# Patient Record
Sex: FEMALE | Race: OTHER | NOT HISPANIC OR LATINO
[De-identification: names, ages, dates, MRNs, and addresses within clinical notes are randomized per-mention and may not be internally consistent; named-entity substitution may affect disease eponyms.]

---

## 2022-07-01 PROBLEM — Z00.00 ENCOUNTER FOR PREVENTIVE HEALTH EXAMINATION: Status: ACTIVE | Noted: 2022-07-01

## 2022-08-17 ENCOUNTER — APPOINTMENT (OUTPATIENT)
Dept: OTOLARYNGOLOGY | Facility: CLINIC | Age: 57
End: 2022-08-17

## 2022-08-17 VITALS
RESPIRATION RATE: 16 BRPM | HEART RATE: 64 BPM | WEIGHT: 278 LBS | BODY MASS INDEX: 48.65 KG/M2 | HEIGHT: 63.5 IN | DIASTOLIC BLOOD PRESSURE: 89 MMHG | SYSTOLIC BLOOD PRESSURE: 138 MMHG

## 2022-08-17 VITALS — BODY MASS INDEX: 48.65 KG/M2 | WEIGHT: 278 LBS | HEIGHT: 63.5 IN

## 2022-08-17 DIAGNOSIS — Z83.49 FAMILY HISTORY OF OTHER ENDOCRINE, NUTRITIONAL AND METABOLIC DISEASES: ICD-10-CM

## 2022-08-17 DIAGNOSIS — R73.03 PREDIABETES.: ICD-10-CM

## 2022-08-17 DIAGNOSIS — Z78.9 OTHER SPECIFIED HEALTH STATUS: ICD-10-CM

## 2022-08-17 PROCEDURE — 99203 OFFICE O/P NEW LOW 30 MIN: CPT

## 2022-08-17 RX ORDER — METFORMIN HYDROCHLORIDE 625 MG/1
TABLET ORAL
Refills: 0 | Status: ACTIVE | COMMUNITY

## 2022-08-17 NOTE — PHYSICAL EXAM
[Nodule] : nodule [Enlarged] : enlarged [FreeTextEntry1] : soft enlargement R lobe thyroid. no palp nodes [Midline] : trachea located in midline position [Normal] : no rashes

## 2022-08-17 NOTE — CONSULT LETTER
[Dear  ___] : Dear  [unfilled], [Consult Letter:] : I had the pleasure of evaluating your patient, [unfilled]. [Please see my note below.] : Please see my note below. [Consult Closing:] : Thank you very much for allowing me to participate in the care of this patient.  If you have any questions, please do not hesitate to contact me. [Sincerely,] : Sincerely, [FreeTextEntry2] : Chun Marin MD ([place], [place]) [FreeTextEntry3] : Blayne Sanchez MD, FACS\par \par    [facility] [place]\par Associate Chair\par    Department of Otolaryngology\par \par Professor\par Otolaryngology & Molecular Medicine\par [facility] [employer]\par

## 2022-08-17 NOTE — HISTORY OF PRESENT ILLNESS
[None] : No associated symptoms are reported. [de-identified] : 58 yo female who is being referred by PCP  Dr. Marin for suspicious thyroid nodule. Reports a few months ago she had a Life Scan and was noted to have a left thyroid nodule. She then saw her PCP who palpated the right side nodule. \par 5/10/2022 sonogram showing right mid pole solid 4.2 cm nodule\par 6/2/2022 FNA right mid thyroid 4.2 cm nodule AUS, molecular positive, Intermediate high 70% probability of cancer/NIFTP.  THADA pos-if CA typically low risk lesiosn\par She states she had recent labs for the thyroid function and was told to be fine, not on medication for her thyroid gland. 8/1/2022 TSH 2.70 \par Denies pain, dysphagia, dysphonia and or dyspnea \par No family history of thyroid cancer. Sister has hypothyroidism\par received Covid19 vacc\par Non smoker. Some alcohol intake on the weekends. \par \par no swallow or resp issues.\par

## 2022-08-29 ENCOUNTER — OUTPATIENT (OUTPATIENT)
Dept: OUTPATIENT SERVICES | Facility: HOSPITAL | Age: 57
LOS: 1 days | End: 2022-08-29
Payer: COMMERCIAL

## 2022-08-29 ENCOUNTER — APPOINTMENT (OUTPATIENT)
Dept: ULTRASOUND IMAGING | Facility: CLINIC | Age: 57
End: 2022-08-29

## 2022-08-29 ENCOUNTER — RESULT REVIEW (OUTPATIENT)
Age: 57
End: 2022-08-29

## 2022-08-29 DIAGNOSIS — Z00.00 ENCOUNTER FOR GENERAL ADULT MEDICAL EXAMINATION WITHOUT ABNORMAL FINDINGS: ICD-10-CM

## 2022-08-29 PROCEDURE — 76536 US EXAM OF HEAD AND NECK: CPT

## 2022-08-29 PROCEDURE — 76536 US EXAM OF HEAD AND NECK: CPT | Mod: 26

## 2022-10-10 ENCOUNTER — OUTPATIENT (OUTPATIENT)
Dept: OUTPATIENT SERVICES | Facility: HOSPITAL | Age: 57
LOS: 1 days | End: 2022-10-10
Payer: COMMERCIAL

## 2022-10-10 VITALS
TEMPERATURE: 97 F | RESPIRATION RATE: 18 BRPM | DIASTOLIC BLOOD PRESSURE: 85 MMHG | SYSTOLIC BLOOD PRESSURE: 132 MMHG | HEART RATE: 81 BPM | HEIGHT: 63 IN | OXYGEN SATURATION: 100 % | WEIGHT: 273.37 LBS

## 2022-10-10 DIAGNOSIS — E04.1 NONTOXIC SINGLE THYROID NODULE: ICD-10-CM

## 2022-10-10 DIAGNOSIS — Z01.818 ENCOUNTER FOR OTHER PREPROCEDURAL EXAMINATION: ICD-10-CM

## 2022-10-10 DIAGNOSIS — Z78.9 OTHER SPECIFIED HEALTH STATUS: ICD-10-CM

## 2022-10-10 LAB
ANION GAP SERPL CALC-SCNC: 9 MMOL/L — SIGNIFICANT CHANGE UP (ref 5–17)
BUN SERPL-MCNC: 11 MG/DL — SIGNIFICANT CHANGE UP (ref 7–23)
CALCIUM SERPL-MCNC: 9.3 MG/DL — SIGNIFICANT CHANGE UP (ref 8.4–10.5)
CHLORIDE SERPL-SCNC: 105 MMOL/L — SIGNIFICANT CHANGE UP (ref 96–108)
CO2 SERPL-SCNC: 29 MMOL/L — SIGNIFICANT CHANGE UP (ref 22–31)
CREAT SERPL-MCNC: 0.9 MG/DL — SIGNIFICANT CHANGE UP (ref 0.5–1.3)
EGFR: 74 ML/MIN/1.73M2 — SIGNIFICANT CHANGE UP
GLUCOSE SERPL-MCNC: 108 MG/DL — HIGH (ref 70–99)
HCT VFR BLD CALC: 41.4 % — SIGNIFICANT CHANGE UP (ref 34.5–45)
HGB BLD-MCNC: 12.9 G/DL — SIGNIFICANT CHANGE UP (ref 11.5–15.5)
MCHC RBC-ENTMCNC: 23.9 PG — LOW (ref 27–34)
MCHC RBC-ENTMCNC: 31.2 GM/DL — LOW (ref 32–36)
MCV RBC AUTO: 76.8 FL — LOW (ref 80–100)
NRBC # BLD: 0 /100 WBCS — SIGNIFICANT CHANGE UP (ref 0–0)
PLATELET # BLD AUTO: 281 K/UL — SIGNIFICANT CHANGE UP (ref 150–400)
POTASSIUM SERPL-MCNC: 4.5 MMOL/L — SIGNIFICANT CHANGE UP (ref 3.5–5.3)
POTASSIUM SERPL-SCNC: 4.5 MMOL/L — SIGNIFICANT CHANGE UP (ref 3.5–5.3)
RBC # BLD: 5.39 M/UL — HIGH (ref 3.8–5.2)
RBC # FLD: 14.9 % — HIGH (ref 10.3–14.5)
SODIUM SERPL-SCNC: 143 MMOL/L — SIGNIFICANT CHANGE UP (ref 135–145)
WBC # BLD: 6.32 K/UL — SIGNIFICANT CHANGE UP (ref 3.8–10.5)
WBC # FLD AUTO: 6.32 K/UL — SIGNIFICANT CHANGE UP (ref 3.8–10.5)

## 2022-10-10 PROCEDURE — 93005 ELECTROCARDIOGRAM TRACING: CPT

## 2022-10-10 PROCEDURE — 36415 COLL VENOUS BLD VENIPUNCTURE: CPT

## 2022-10-10 PROCEDURE — 85027 COMPLETE CBC AUTOMATED: CPT

## 2022-10-10 PROCEDURE — G0463: CPT

## 2022-10-10 PROCEDURE — 93010 ELECTROCARDIOGRAM REPORT: CPT | Mod: NC

## 2022-10-10 PROCEDURE — 80048 BASIC METABOLIC PNL TOTAL CA: CPT

## 2022-10-10 NOTE — H&P PST ADULT - NSANTHOSAYNRD_GEN_A_CORE
neck 16 inches/No. ALISIA screening performed.  STOP BANG Legend: 0-2 = LOW Risk; 3-4 = INTERMEDIATE Risk; 5-8 = HIGH Risk

## 2022-10-10 NOTE — H&P PST ADULT - HISTORY OF PRESENT ILLNESS
56 y/o female with PMH of obesity presents for [facility].  C/O incidental finding of right thyroid nodule discovered on a " life scan" and is now recommended for surgery.  Feeling well at [facility] today with no recent cough fever or illness.  Scheduled for right thyroidectomy w/frozen section with Dr. Sanchez on 10/17/2022.  COVId-19 testing information provided by surgeons office.

## 2022-10-10 NOTE — H&P PST ADULT - NSICDXPASTMEDICALHX_GEN_ALL_CORE_FT
PAST MEDICAL HISTORY:  History of 2019 novel coronavirus disease (COVID-19) 2/2022 - mild    History of obesity

## 2022-10-10 NOTE — H&P PST ADULT - PROBLEM SELECTOR PLAN 1
Scheduled for right thyroidectomy w/frozen section with Dr. Sanchez on 10/17/2022.  COVId-19 testing information provided by surgeons office.  Pre op instructions given and patient verbalized understanding.  CBC, BMP, EKG and medical clearance pending.  NPO after midnight night before procedure.  To stop all ASA, NSAIDs, vitamins and supplements 1 week prior to procedure.  Chlorhexidine wash given with instructions.

## 2022-10-10 NOTE — H&P PST ADULT - NSICDXFAMILYHX_GEN_ALL_CORE_FT
FAMILY HISTORY:  Mother  Still living? Unknown  FH: hypertension, Age at diagnosis: Age Unknown    Sibling  Still living? Unknown  Family history of DVT, Age at diagnosis: Age Unknown  FH: hypothyroidism, Age at diagnosis: Age Unknown

## 2022-10-16 ENCOUNTER — TRANSCRIPTION ENCOUNTER (OUTPATIENT)
Age: 57
End: 2022-10-16

## 2022-10-17 ENCOUNTER — TRANSCRIPTION ENCOUNTER (OUTPATIENT)
Age: 57
End: 2022-10-17

## 2022-10-17 ENCOUNTER — APPOINTMENT (OUTPATIENT)
Dept: OTOLARYNGOLOGY | Facility: HOSPITAL | Age: 57
End: 2022-10-17

## 2022-10-17 ENCOUNTER — RESULT REVIEW (OUTPATIENT)
Age: 57
End: 2022-10-17

## 2022-10-17 ENCOUNTER — OUTPATIENT (OUTPATIENT)
Dept: OUTPATIENT SERVICES | Facility: HOSPITAL | Age: 57
LOS: 1 days | End: 2022-10-17
Payer: COMMERCIAL

## 2022-10-17 VITALS
OXYGEN SATURATION: 98 % | RESPIRATION RATE: 16 BRPM | DIASTOLIC BLOOD PRESSURE: 79 MMHG | HEART RATE: 60 BPM | SYSTOLIC BLOOD PRESSURE: 116 MMHG

## 2022-10-17 VITALS
SYSTOLIC BLOOD PRESSURE: 136 MMHG | OXYGEN SATURATION: 97 % | HEART RATE: 84 BPM | HEIGHT: 63 IN | TEMPERATURE: 98 F | RESPIRATION RATE: 14 BRPM | DIASTOLIC BLOOD PRESSURE: 91 MMHG | WEIGHT: 273.37 LBS

## 2022-10-17 DIAGNOSIS — Z78.9 OTHER SPECIFIED HEALTH STATUS: ICD-10-CM

## 2022-10-17 DIAGNOSIS — E89.0 POSTPROCEDURAL HYPOTHYROIDISM: Chronic | ICD-10-CM

## 2022-10-17 DIAGNOSIS — E04.1 NONTOXIC SINGLE THYROID NODULE: ICD-10-CM

## 2022-10-17 PROBLEM — Z86.16 PERSONAL HISTORY OF COVID-19: Chronic | Status: ACTIVE | Noted: 2022-10-10

## 2022-10-17 PROBLEM — Z86.39 PERSONAL HISTORY OF OTHER ENDOCRINE, NUTRITIONAL AND METABOLIC DISEASE: Chronic | Status: ACTIVE | Noted: 2022-10-10

## 2022-10-17 PROCEDURE — 88307 TISSUE EXAM BY PATHOLOGIST: CPT | Mod: 26

## 2022-10-17 PROCEDURE — 60220 PARTIAL REMOVAL OF THYROID: CPT | Mod: RT

## 2022-10-17 PROCEDURE — C1889: CPT

## 2022-10-17 PROCEDURE — 60220 PARTIAL REMOVAL OF THYROID: CPT | Mod: AS,RT

## 2022-10-17 PROCEDURE — C9399: CPT

## 2022-10-17 PROCEDURE — 88307 TISSUE EXAM BY PATHOLOGIST: CPT

## 2022-10-17 DEVICE — HEMOCLIP MED BLUE 6 CARTRIDGE TITANIUM: Type: IMPLANTABLE DEVICE | Site: RIGHT | Status: FUNCTIONAL

## 2022-10-17 DEVICE — CLIP 24 SMALL TITAN: Type: IMPLANTABLE DEVICE | Site: RIGHT | Status: FUNCTIONAL

## 2022-10-17 DEVICE — CARTRIDGE MICROCLIP 30: Type: IMPLANTABLE DEVICE | Site: RIGHT | Status: FUNCTIONAL

## 2022-10-17 DEVICE — TUBE EMG NIM ENDO TRIVANTAGE 8MM: Type: IMPLANTABLE DEVICE | Site: RIGHT | Status: FUNCTIONAL

## 2022-10-17 DEVICE — ET TUBE SZ 7 NIM TRIVANTAGE EMG: Type: IMPLANTABLE DEVICE | Site: RIGHT | Status: FUNCTIONAL

## 2022-10-17 DEVICE — SPONGE HSTAT SURGICEL 2X14": Type: IMPLANTABLE DEVICE | Site: RIGHT | Status: FUNCTIONAL

## 2022-10-17 RX ORDER — ONDANSETRON 8 MG/1
4 TABLET, FILM COATED ORAL ONCE
Refills: 0 | Status: DISCONTINUED | OUTPATIENT
Start: 2022-10-17 | End: 2022-10-17

## 2022-10-17 RX ORDER — OXYCODONE AND ACETAMINOPHEN 5; 325 MG/1; MG/1
1 TABLET ORAL
Qty: 8 | Refills: 0
Start: 2022-10-17 | End: 2022-10-18

## 2022-10-17 RX ORDER — SODIUM CHLORIDE 9 MG/ML
1000 INJECTION, SOLUTION INTRAVENOUS
Refills: 0 | Status: DISCONTINUED | OUTPATIENT
Start: 2022-10-17 | End: 2022-10-17

## 2022-10-17 RX ORDER — SODIUM CHLORIDE 9 MG/ML
3 INJECTION INTRAMUSCULAR; INTRAVENOUS; SUBCUTANEOUS ONCE
Refills: 0 | Status: DISCONTINUED | OUTPATIENT
Start: 2022-10-17 | End: 2022-10-31

## 2022-10-17 RX ORDER — OXYCODONE AND ACETAMINOPHEN 5; 325 MG/1; MG/1
1 TABLET ORAL ONCE
Refills: 0 | Status: DISCONTINUED | OUTPATIENT
Start: 2022-10-17 | End: 2022-10-17

## 2022-10-17 RX ORDER — HYDROMORPHONE HYDROCHLORIDE 2 MG/ML
0.5 INJECTION INTRAMUSCULAR; INTRAVENOUS; SUBCUTANEOUS
Refills: 0 | Status: DISCONTINUED | OUTPATIENT
Start: 2022-10-17 | End: 2022-10-17

## 2022-10-17 RX ADMIN — HYDROMORPHONE HYDROCHLORIDE 0.5 MILLIGRAM(S): 2 INJECTION INTRAMUSCULAR; INTRAVENOUS; SUBCUTANEOUS at 11:59

## 2022-10-17 RX ADMIN — HYDROMORPHONE HYDROCHLORIDE 0.5 MILLIGRAM(S): 2 INJECTION INTRAMUSCULAR; INTRAVENOUS; SUBCUTANEOUS at 12:15

## 2022-10-17 NOTE — ASU DISCHARGE PLAN (ADULT/PEDIATRIC) - NURSING INSTRUCTIONS
All discharge information reviewed with patient including pain, safety, medication and follow up care . Pt acknowledges understanding of discharge instructions.  Follow up with Dr. Sanchez /LIDIA Levy  Next week for suture removal   [phone]

## 2022-10-17 NOTE — ASU DISCHARGE PLAN (ADULT/PEDIATRIC) - NS MD DC FALL RISK RISK
For information on Fall & Injury Prevention, visit: [email or web address] OR  [email or web address] OR  https://www.cdc.gov/steadi/patient.html

## 2022-10-17 NOTE — ASU PATIENT PROFILE, ADULT - FALL HARM RISK - UNIVERSAL INTERVENTIONS
Bed in lowest position, wheels locked, appropriate side rails in place/Call bell, personal items and telephone in reach/Instruct patient to call for assistance before getting out of bed or chair/Non-slip footwear when patient is out of bed/[place] to call system/Physically safe environment - no spills, clutter or unnecessary equipment/Purposeful Proactive Rounding/Room/bathroom lighting operational, light cord in reach

## 2022-10-17 NOTE — ASU PREOP CHECKLIST - NS PREOP CHK CHLOROHEX WASH
Received request via: Pharmacy    Was the patient seen in the last year in this department? Yes    Does the patient have an active prescription (recently filled or refills available) for medication(s) requested? No   at home:

## 2022-10-17 NOTE — ASU PATIENT PROFILE, ADULT - HEALTHCARE INFORMATION NEEDED, PROFILE
From: Glenna Villanueva  To: Jaz Pacheco MD  Sent: 2/5/2018 10:58 PM CST  Subject: Visit Follow-up Question    Hi   I’m writing in regards to getting home healthcare services for my parents Mariaa Billy and Timothy Dye. I spoke with nurse Barbra Nixon last Monday , none

## 2022-10-17 NOTE — ASU PATIENT PROFILE, ADULT - NS PRO ABUSE SCREEN AFRAID ANYONE YN
-- DO NOT REPLY / DO NOT REPLY ALL --  -- Message is from the [facility]--    COVID-19 Universal Screening: N/A - Not about scheduling    General Patient Message      Reason for Call: patient called in because she need the referral for the ENT faxed to [phone] because her appointment is on 12/07/20 so any questions please call     Caller Information       Type Contact Phone    12/02/2020 09:30 AM CST Phone (Incoming) Carolynn Dos Santos (Self) [phone] (H)          Alternative phone number: none    Turnaround time given to caller:   \"This message will be sent to [state Provider's name]. The clinical team will fulfill your request as soon as they review your message.\"     no

## 2022-10-26 ENCOUNTER — APPOINTMENT (OUTPATIENT)
Dept: OTOLARYNGOLOGY | Facility: CLINIC | Age: 57
End: 2022-10-26

## 2022-10-26 PROCEDURE — 99024 POSTOP FOLLOW-UP VISIT: CPT

## 2022-10-27 NOTE — CONSULT LETTER
[Dear  ___] : Dear  [unfilled], [Please see my note below.] : Please see my note below. [Sincerely,] : Sincerely, [Courtesy Letter:] : I had the pleasure of seeing your patient, [unfilled], in my office today. [FreeTextEntry2] : Chun Marin MD ([place], [place]) \par \par  [FreeTextEntry3] : Stefany Thacker ([place]) PAC\par Blayne Sanchez MD, FACS\par \par    [facility] [place]\par Associate Chair\par    Department of Otolaryngology\par \par Professor\par Otolaryngology & Molecular Medicine\par [facility] [employer]\par

## 2022-10-27 NOTE — HISTORY OF PRESENT ILLNESS
[de-identified] : Pt presents  post right thyroid lobectomy and isthmusectomy for suspicious thyroid nodule on 10/17/2022.  Pt is without any complaints and denies any pain or discomfort, or voice change. Pt also has no neck mass, no difficulty swallowing and no painful swallowing. Path c.w -Papillary thyroid carcinoma, right lobe -Tumor size 3.5 mm  -Thyroid follicular nodular disease. Pt will be making an appointment with her endocrinologist, Dr. Marin in 3 weeks. \par \par

## 2022-11-29 ENCOUNTER — APPOINTMENT (OUTPATIENT)
Dept: OTOLARYNGOLOGY | Facility: CLINIC | Age: 57
End: 2022-11-29

## 2022-11-29 PROCEDURE — 99024 POSTOP FOLLOW-UP VISIT: CPT

## 2022-11-29 NOTE — HISTORY OF PRESENT ILLNESS
[None] : No associated symptoms are reported. [de-identified] : Ms. Murrell is s/p  right thyroid lobectomy and isthmusectomy for suspicious thyroid nodule on 10/17/2022.   Final path cw ptca. Denies pain, dysphagia, dysphonia and or dyspnea. \par Had recent labs with Dr. Marin.11/22/2022 TSH 3.95\par \par

## 2022-11-29 NOTE — CONSULT LETTER
[Dear  ___] : Dear  [unfilled], [Courtesy Letter:] : I had the pleasure of seeing your patient, [unfilled], in my office today. [Please see my note below.] : Please see my note below. [Sincerely,] : Sincerely, [FreeTextEntry2] : Chun Marin MD ([place], [place]) [FreeTextEntry3] : Blayne Sanchez MD, FACS\par \par    [facility] [place]\par Associate Chair\par    Department of Otolaryngology\par \par Professor\par Otolaryngology & Molecular Medicine\par [facility] [employer]\par

## 2022-12-03 ENCOUNTER — NON-APPOINTMENT (OUTPATIENT)
Age: 57
End: 2022-12-03

## 2022-12-07 ENCOUNTER — NON-APPOINTMENT (OUTPATIENT)
Age: 57
End: 2022-12-07

## 2023-01-17 ENCOUNTER — OUTPATIENT (OUTPATIENT)
Dept: OUTPATIENT SERVICES | Facility: HOSPITAL | Age: 58
LOS: 1 days | End: 2023-01-17
Payer: COMMERCIAL

## 2023-01-17 VITALS
DIASTOLIC BLOOD PRESSURE: 86 MMHG | TEMPERATURE: 97 F | HEART RATE: 72 BPM | RESPIRATION RATE: 16 BRPM | WEIGHT: 277.78 LBS | OXYGEN SATURATION: 97 % | SYSTOLIC BLOOD PRESSURE: 155 MMHG | HEIGHT: 63 IN

## 2023-01-17 DIAGNOSIS — E89.0 POSTPROCEDURAL HYPOTHYROIDISM: Chronic | ICD-10-CM

## 2023-01-17 DIAGNOSIS — E11.9 TYPE 2 DIABETES MELLITUS WITHOUT COMPLICATIONS: ICD-10-CM

## 2023-01-17 DIAGNOSIS — C73 MALIGNANT NEOPLASM OF THYROID GLAND: ICD-10-CM

## 2023-01-17 DIAGNOSIS — Z01.818 ENCOUNTER FOR OTHER PREPROCEDURAL EXAMINATION: ICD-10-CM

## 2023-01-17 LAB
ANION GAP SERPL CALC-SCNC: 6 MMOL/L — SIGNIFICANT CHANGE UP (ref 5–17)
BUN SERPL-MCNC: 7 MG/DL — SIGNIFICANT CHANGE UP (ref 7–23)
CALCIUM SERPL-MCNC: 9.5 MG/DL — SIGNIFICANT CHANGE UP (ref 8.4–10.5)
CHLORIDE SERPL-SCNC: 105 MMOL/L — SIGNIFICANT CHANGE UP (ref 96–108)
CO2 SERPL-SCNC: 31 MMOL/L — SIGNIFICANT CHANGE UP (ref 22–31)
CREAT SERPL-MCNC: 0.73 MG/DL — SIGNIFICANT CHANGE UP (ref 0.5–1.3)
EGFR: 95 ML/MIN/1.73M2 — SIGNIFICANT CHANGE UP
GLUCOSE SERPL-MCNC: 99 MG/DL — SIGNIFICANT CHANGE UP (ref 70–99)
HCT VFR BLD CALC: 39.6 % — SIGNIFICANT CHANGE UP (ref 34.5–45)
HGB BLD-MCNC: 12.3 G/DL — SIGNIFICANT CHANGE UP (ref 11.5–15.5)
MCHC RBC-ENTMCNC: 24.6 PG — LOW (ref 27–34)
MCHC RBC-ENTMCNC: 31.1 GM/DL — LOW (ref 32–36)
MCV RBC AUTO: 79.2 FL — LOW (ref 80–100)
NRBC # BLD: 0 /100 WBCS — SIGNIFICANT CHANGE UP (ref 0–0)
PLATELET # BLD AUTO: 240 K/UL — SIGNIFICANT CHANGE UP (ref 150–400)
POTASSIUM SERPL-MCNC: 3.8 MMOL/L — SIGNIFICANT CHANGE UP (ref 3.5–5.3)
POTASSIUM SERPL-SCNC: 3.8 MMOL/L — SIGNIFICANT CHANGE UP (ref 3.5–5.3)
RBC # BLD: 5 M/UL — SIGNIFICANT CHANGE UP (ref 3.8–5.2)
RBC # FLD: 15.3 % — HIGH (ref 10.3–14.5)
SODIUM SERPL-SCNC: 142 MMOL/L — SIGNIFICANT CHANGE UP (ref 135–145)
WBC # BLD: 7.41 K/UL — SIGNIFICANT CHANGE UP (ref 3.8–10.5)
WBC # FLD AUTO: 7.41 K/UL — SIGNIFICANT CHANGE UP (ref 3.8–10.5)

## 2023-01-17 PROCEDURE — 85027 COMPLETE CBC AUTOMATED: CPT

## 2023-01-17 PROCEDURE — 80048 BASIC METABOLIC PNL TOTAL CA: CPT

## 2023-01-17 PROCEDURE — G0463: CPT

## 2023-01-17 PROCEDURE — 36415 COLL VENOUS BLD VENIPUNCTURE: CPT

## 2023-01-17 RX ORDER — ACETAMINOPHEN 500 MG
2 TABLET ORAL
Qty: 0 | Refills: 0 | DISCHARGE

## 2023-01-17 NOTE — H&P PST ADULT - OPHTHALMOLOGIC
negative Other (Free Text): Pt to see a general surgeon.  Feels deep, about 2 cm long, and possibly 2 pieces at the site.  Recommended Dr. Cohn. Note Text (......Xxx Chief Complaint.): This diagnosis correlates with the Detail Level: Zone

## 2023-01-17 NOTE — H&P PST ADULT - PROBLEM SELECTOR PLAN 1
Patient provided with pre-operative instructions and verbalized understanding.  Patient will be NPO on day of surgery. Patient will stop NSAIDs, aspirin, herbal supplements or vitamins 1 week prior to surgery.  Chlorhexadine wash provided with instructions.  COVID PCR pending per policy.    -b/p slightly elevated today.  Will send patient for medical clearance. Pt reports that she may have white coat syndrome

## 2023-01-17 NOTE — H&P PST ADULT - NSICDXPASTMEDICALHX_GEN_ALL_CORE_FT
PAST MEDICAL HISTORY:  History of 2019 novel coronavirus disease (COVID-19) 2/2022 - mild    History of obesity     Malignant neoplasm of thyroid gland

## 2023-01-17 NOTE — H&P PST ADULT - HISTORY OF PRESENT ILLNESS
56 y/o female with PMH of obesity (taking metformin for weight loss) presents for PST for left thyroidectomy.  s/p right thyroidectomy w/frozen section for thyroid nodule, with Dr. Sanchez on 10/17/2022.  Pathology revealed papillary thyroid cancer.  Therefore patient was advised to have left thyroidectomy.  Otherwise patient feels well today and denies any acute symptoms.

## 2023-01-29 ENCOUNTER — TRANSCRIPTION ENCOUNTER (OUTPATIENT)
Age: 58
End: 2023-01-29

## 2023-01-30 ENCOUNTER — APPOINTMENT (OUTPATIENT)
Dept: OTOLARYNGOLOGY | Facility: HOSPITAL | Age: 58
End: 2023-01-30

## 2023-01-30 ENCOUNTER — OUTPATIENT (OUTPATIENT)
Dept: OUTPATIENT SERVICES | Facility: HOSPITAL | Age: 58
LOS: 1 days | End: 2023-01-30
Payer: COMMERCIAL

## 2023-01-30 ENCOUNTER — TRANSCRIPTION ENCOUNTER (OUTPATIENT)
Age: 58
End: 2023-01-30

## 2023-01-30 ENCOUNTER — RESULT REVIEW (OUTPATIENT)
Age: 58
End: 2023-01-30

## 2023-01-30 VITALS
RESPIRATION RATE: 18 BRPM | DIASTOLIC BLOOD PRESSURE: 71 MMHG | OXYGEN SATURATION: 97 % | SYSTOLIC BLOOD PRESSURE: 110 MMHG | TEMPERATURE: 98 F | HEART RATE: 79 BPM

## 2023-01-30 VITALS
HEART RATE: 76 BPM | SYSTOLIC BLOOD PRESSURE: 135 MMHG | TEMPERATURE: 98 F | WEIGHT: 277.78 LBS | DIASTOLIC BLOOD PRESSURE: 88 MMHG | HEIGHT: 63 IN | RESPIRATION RATE: 16 BRPM | OXYGEN SATURATION: 98 %

## 2023-01-30 DIAGNOSIS — E89.0 POSTPROCEDURAL HYPOTHYROIDISM: Chronic | ICD-10-CM

## 2023-01-30 DIAGNOSIS — C73 MALIGNANT NEOPLASM OF THYROID GLAND: ICD-10-CM

## 2023-01-30 LAB
PTH INTACT, INTRAOP TIMING 2: SIGNIFICANT CHANGE UP
PTH INTACT, INTRAOPERATIVE 2: 71 PG/ML — HIGH (ref 15–65)
PTH-INTACT IO % DIF SERPL: 87 PG/ML — HIGH (ref 15–65)

## 2023-01-30 PROCEDURE — 60240 REMOVAL OF THYROID: CPT

## 2023-01-30 PROCEDURE — 88307 TISSUE EXAM BY PATHOLOGIST: CPT

## 2023-01-30 PROCEDURE — 88307 TISSUE EXAM BY PATHOLOGIST: CPT | Mod: 26

## 2023-01-30 PROCEDURE — 36415 COLL VENOUS BLD VENIPUNCTURE: CPT

## 2023-01-30 PROCEDURE — 60260 REPEAT THYROID SURGERY: CPT | Mod: AS,LT

## 2023-01-30 PROCEDURE — 83970 ASSAY OF PARATHORMONE: CPT

## 2023-01-30 PROCEDURE — C9399: CPT

## 2023-01-30 PROCEDURE — 60260 REPEAT THYROID SURGERY: CPT | Mod: LT

## 2023-01-30 PROCEDURE — C1889: CPT

## 2023-01-30 DEVICE — SURGICEL 2 X 14"
Type: IMPLANTABLE DEVICE | Status: NON-FUNCTIONAL
Removed: 2023-01-30

## 2023-01-30 DEVICE — LIGATING CLIPS WECK HORIZON SMALL (YELLOW) 24
Type: IMPLANTABLE DEVICE | Status: NON-FUNCTIONAL
Removed: 2023-01-30

## 2023-01-30 DEVICE — CARTRIDGE MICROCLIP 30
Type: IMPLANTABLE DEVICE | Status: NON-FUNCTIONAL
Removed: 2023-01-30

## 2023-01-30 DEVICE — LIGATING CLIPS WECK HORIZON MEDIUM (BLUE) 6
Type: IMPLANTABLE DEVICE | Status: NON-FUNCTIONAL
Removed: 2023-01-30

## 2023-01-30 RX ORDER — SODIUM CHLORIDE 9 MG/ML
1000 INJECTION, SOLUTION INTRAVENOUS
Refills: 0 | Status: DISCONTINUED | OUTPATIENT
Start: 2023-01-30 | End: 2023-01-30

## 2023-01-30 RX ORDER — ACETAMINOPHEN 500 MG
2 TABLET ORAL
Qty: 0 | Refills: 0 | DISCHARGE

## 2023-01-30 RX ORDER — LEVOTHYROXINE SODIUM 125 MCG
1 TABLET ORAL
Qty: 30 | Refills: 1
Start: 2023-01-30 | End: 2023-03-30

## 2023-01-30 RX ORDER — HYDROMORPHONE HYDROCHLORIDE 2 MG/ML
0.5 INJECTION INTRAMUSCULAR; INTRAVENOUS; SUBCUTANEOUS
Refills: 0 | Status: DISCONTINUED | OUTPATIENT
Start: 2023-01-30 | End: 2023-01-30

## 2023-01-30 RX ORDER — ONDANSETRON 8 MG/1
4 TABLET, FILM COATED ORAL ONCE
Refills: 0 | Status: DISCONTINUED | OUTPATIENT
Start: 2023-01-30 | End: 2023-01-30

## 2023-01-30 RX ORDER — METFORMIN HYDROCHLORIDE 850 MG/1
1 TABLET ORAL
Qty: 0 | Refills: 0 | DISCHARGE

## 2023-01-30 RX ORDER — HYDROMORPHONE HYDROCHLORIDE 2 MG/ML
1 INJECTION INTRAMUSCULAR; INTRAVENOUS; SUBCUTANEOUS
Refills: 0 | Status: DISCONTINUED | OUTPATIENT
Start: 2023-01-30 | End: 2023-01-30

## 2023-01-30 RX ORDER — MULTIVIT-MIN/FERROUS GLUCONATE 9 MG/15 ML
1 LIQUID (ML) ORAL
Qty: 0 | Refills: 0 | DISCHARGE

## 2023-01-30 RX ORDER — OXYCODONE HYDROCHLORIDE 5 MG/1
1 TABLET ORAL
Qty: 12 | Refills: 0
Start: 2023-01-30 | End: 2023-02-01

## 2023-01-30 NOTE — ASU PREOP CHECKLIST - STERILIZATION AFFIRMATION
Detail Level: Detailed Depth Of Biopsy: dermis Was A Bandage Applied: Yes Size Of Lesion In Cm: 0.7 X Size Of Lesion In Cm: 0 Biopsy Type: H and E Biopsy Method: Dermablade Anesthesia Type: 1% lidocaine with epinephrine Anesthesia Volume In Cc: 0.5 Hemostasis: Drysol Wound Care: Petrolatum Dressing: bandage Destruction After The Procedure: No Type Of Destruction Used: Curettage Curettage Text: The wound bed was treated with curettage after the biopsy was performed. Cryotherapy Text: The wound bed was treated with cryotherapy after the biopsy was performed. Electrodesiccation Text: The wound bed was treated with electrodesiccation after the biopsy was performed. Electrodesiccation And Curettage Text: The wound bed was treated with electrodesiccation and curettage after the biopsy was performed. Silver Nitrate Text: The wound bed was treated with silver nitrate after the biopsy was performed. Lab: [id number] Lab Facility: [id number] Consent: Written consent was obtained and risks were reviewed including but not limited to scarring, infection, bleeding, scabbing, incomplete removal, nerve damage and allergy to anesthesia. Post-Care Instructions: I reviewed with the patient in detail post-care instructions. Patient is to keep the biopsy site dry overnight, and then apply bacitracin twice daily until healed. Patient may apply hydrogen peroxide soaks to remove any crusting. Notification Instructions: Patient will be notified of biopsy results. However, patient instructed to call the office if not contacted within 2 weeks. Billing Type: Third-Party Bill Information: Selecting Yes will display possible errors in your note based on the variables you have selected. This validation is only offered as a suggestion for you. PLEASE NOTE THAT THE VALIDATION TEXT WILL BE REMOVED WHEN YOU FINALIZE YOUR NOTE. IF YOU WANT TO FAX A PRELIMINARY NOTE YOU WILL NEED TO TOGGLE THIS TO 'NO' IF YOU DO NOT WANT IT IN YOUR FAXED NOTE. no

## 2023-01-30 NOTE — ASU DISCHARGE PLAN (ADULT/PEDIATRIC) - CARE PROVIDER_API CALL
Blayne Sanchez)  [facility]; Otolaryngology  [address] [place] [id number]  Phone: [phone]  Fax: [phone]  Follow Up Time:

## 2023-01-30 NOTE — ASU PATIENT PROFILE, ADULT - FALL HARM RISK - UNIVERSAL INTERVENTIONS
Bed in lowest position, wheels locked, appropriate side rails in place/Call bell, personal items and telephone in reach/Instruct patient to call for assistance before getting out of bed or chair/Non-slip footwear when patient is out of bed/[place] to call system/Physically safe environment - no spills, clutter or unnecessary equipment/Purposeful Proactive Rounding/Room/bathroom lighting operational, light cord in reach

## 2023-01-30 NOTE — ASU DISCHARGE PLAN (ADULT/PEDIATRIC) - ASU DC SPECIAL INSTRUCTIONSFT
YOU MAY SHOWER IN 48 HOURS    START TAKING SYNTHROID 175 MCG DAILY    TAKE OXYCODONE FOR SEVERE PAIN OTHERWISE TAKE TYLENOL    NO HEAVY LIFTING, BENDING OR STRAINING    FOLLOW UP WITH DR. MEYERS IN 1 WEEK, PLEASE CALL THE OFFICE FOR AN APPOINTMENT

## 2023-02-03 LAB — SURGICAL PATHOLOGY STUDY: SIGNIFICANT CHANGE UP

## 2023-02-08 ENCOUNTER — APPOINTMENT (OUTPATIENT)
Dept: OTOLARYNGOLOGY | Facility: CLINIC | Age: 58
End: 2023-02-08
Payer: COMMERCIAL

## 2023-02-08 PROCEDURE — 99024 POSTOP FOLLOW-UP VISIT: CPT

## 2023-02-08 NOTE — CONSULT LETTER
[Dear  ___] : Dear  [unfilled], [Please see my note below.] : Please see my note below. [Sincerely,] : Sincerely, [DrJerilyn  ___] : Dr. SANCHEZ [Courtesy Letter:] : I had the pleasure of seeing your patient, [unfilled], in my office today. [FreeTextEntry2] : Chun Marin MD ([place], [place])  [FreeTextEntry3] : Stefany Thacker ([place]) PAC\par Dr. Blayne Sanchez\par Head and Neck Surgery\par [facility]\par [address]\[place] [address]\par Tel: [phone]\par

## 2023-02-08 NOTE — HISTORY OF PRESENT ILLNESS
[de-identified] : pt with hx of right thyroidectomy for PTC and here post left completion thyroidectomy on  1/30/2023.  Pt is without any complaints and denies any pain or discomfort, or voice change. Pt also has no neck mass, no difficulty swallowing and no painful swallowing. Her post op PTH 71 and given synthroid 175 mcg.  Path No malignancy identified. Chronic lymphocytic thyroiditis. Pt will be making an appointment with her endocrinologist, Dr. Oconnor  in 4 weeks.\par

## 2023-02-28 LAB
T3FREE SERPL-MCNC: 3.23 PG/ML
T4 FREE SERPL-MCNC: 2.4 NG/DL
TSH SERPL-ACNC: 0.11 UIU/ML

## 2023-03-01 ENCOUNTER — NON-APPOINTMENT (OUTPATIENT)
Age: 58
End: 2023-03-01

## 2023-03-01 DIAGNOSIS — E04.1 NONTOXIC SINGLE THYROID NODULE: ICD-10-CM

## 2023-03-01 RX ORDER — LEVOTHYROXINE SODIUM 0.15 MG/1
150 TABLET ORAL DAILY
Qty: 30 | Refills: 1 | Status: ACTIVE | COMMUNITY
Start: 2023-03-01 | End: 1900-01-01

## 2023-03-07 ENCOUNTER — APPOINTMENT (OUTPATIENT)
Dept: OTOLARYNGOLOGY | Facility: CLINIC | Age: 58
End: 2023-03-07
Payer: COMMERCIAL

## 2023-03-07 DIAGNOSIS — C73 MALIGNANT NEOPLASM OF THYROID GLAND: ICD-10-CM

## 2023-03-07 PROCEDURE — 99024 POSTOP FOLLOW-UP VISIT: CPT

## 2023-03-07 NOTE — HISTORY OF PRESENT ILLNESS
[None] : No associated symptoms are reported. [de-identified] : Ms. Murrell is s/p  left completion thyroidectomy on 1/30/2023 for PTCA\par 2/27/2023 TSH  0.11 Levothyroxine dose decreased to 150 mcg daily \par Denies pain, dysphagia, dysphonia and or dyspnea \par Appt. with Dr. Khan in [place]

## 2023-03-07 NOTE — CONSULT LETTER
[Dear  ___] : Dear  [unfilled], [DrJerilyn  ___] : Dr. SANCHEZ [Courtesy Letter:] : I had the pleasure of seeing your patient, [unfilled], in my office today. [Please see my note below.] : Please see my note below. [Sincerely,] : Sincerely, [FreeTextEntry2] : Chun Marin MD ([place], [place]) \par  [FreeTextEntry3] : Blayne Sanchez MD, FACS\par \par    [facility] [place]\par Associate Chair\par    Department of Otolaryngology\par \par Professor\par Otolaryngology & Molecular Medicine\par [facility] [employer]\par

## 2024-08-04 NOTE — ASU PREOP CHECKLIST - SKIN PREP
Patient presents ambulatory with c/o wheezing cough that is getting worse despite new medications. Denies shortness of breath.   to surgical area by PCA/done

## (undated) DEVICE — DRAIN JACKSON PRATT 7FR ROUND END NO TROCAR

## (undated) DEVICE — SAFETY PIN

## (undated) DEVICE — ELCTR GROUNDING PAD ADULT COVIDIEN

## (undated) DEVICE — GLV 7 PROTEXIS (WHITE)

## (undated) DEVICE — VENODYNE/SCD SLEEVE CALF MEDIUM

## (undated) DEVICE — VISITEC 4X4

## (undated) DEVICE — SUT MONOSOF 6-0 18" P-10

## (undated) DEVICE — DRAPE TOP SHEET 53"X101"

## (undated) DEVICE — BIPOLAR FORCEP KIRWAN JEWELERS STR 4" X 0.4MM W 12FT CORD (GREEN)

## (undated) DEVICE — PREP BETADINE SPONGE STICKS

## (undated) DEVICE — STAPLER SKIN VISI-STAT 35 WIDE

## (undated) DEVICE — DRSG 4X4

## (undated) DEVICE — ELCTR PENCIL NEPTUNE SMOKE EVACUATION

## (undated) DEVICE — DRAPE 3/4 SHEET 52X76"

## (undated) DEVICE — NDL HYPO SAFE 22G X 1" (BLACK)

## (undated) DEVICE — DRAPE TOWEL BLUE 17" X 24"

## (undated) DEVICE — DRSG STERISTRIPS 0.25X3"

## (undated) DEVICE — SUT SILK 2-0 18" FS

## (undated) DEVICE — DRAPE FLUID WARMER 44X44"

## (undated) DEVICE — LONE START RETRACTOR RING 12MM BLUNT DISP

## (undated) DEVICE — NDL HYPO SAFE 22G X 1"

## (undated) DEVICE — DRAPE TOP SHEET 53" X 101"

## (undated) DEVICE — SPONGE PEANUT AUTO COUNT

## (undated) DEVICE — SUT SILK 2-0 18" TIES

## (undated) DEVICE — SYR LUER LOK 5CC

## (undated) DEVICE — ELCTR STRYKER NEPTUNE SMOKE EVACUATION PENCIL (GREEN)

## (undated) DEVICE — FORCEP BIPOLAR NON STICK 4" W 12FT CORD DISP

## (undated) DEVICE — PACK HEAD & NECK

## (undated) DEVICE — WARMING BLANKET LOWER ADULT

## (undated) DEVICE — SUT MONOCRYL 4-0 18" P-3 UNDYED

## (undated) DEVICE — SOL IRR POUR H2O 500ML

## (undated) DEVICE — DRAPE SPLIT SHEETS 77X120"

## (undated) DEVICE — FRAZIER SUCTION TIP 10FR

## (undated) DEVICE — DRSG CURITY GAUZE SPONGE 4 X 4" 12-PLY

## (undated) DEVICE — LABEL MED BLANK W PEN

## (undated) DEVICE — DRSG TEGADERM 4X4.75"

## (undated) DEVICE — DRAPE INSTRUMENT POUCH

## (undated) DEVICE — DRAPE MAGNETIC INSTRUMENT MEDIUM

## (undated) DEVICE — PROBE STIMULATOR MONOPOLAR FLUSH TIP

## (undated) DEVICE — SUT SILK 3-0 18" TIES

## (undated) DEVICE — SUT VICRYL 4-0 27" RB-1 UNDYED

## (undated) DEVICE — LONE STAR RETRACTOR RING 12MM BLUNT DISP

## (undated) DEVICE — DRAIN RESERVOIR FOR JACKSON PRATT 100CC CARDINAL

## (undated) DEVICE — ELCTR MONOPOLAR STIMULATOR PROBE FLUSH-TIP

## (undated) DEVICE — FRAZIER SUCTION TIP 8FR

## (undated) DEVICE — LABELS BLANK W PEN

## (undated) DEVICE — DRAPE FLUID WARMER 44 X 44"

## (undated) DEVICE — DRAPE SPLIT SHEET 77" X 120"

## (undated) DEVICE — WRAP COMPRESSION CALF MED

## (undated) DEVICE — SPONGE RAYTEC 4X4 16PLY

## (undated) DEVICE — BLANKET WARMER LOWER ADULT

## (undated) DEVICE — DRSG BENZOIN 0.6CC

## (undated) DEVICE — DRSG 3M BENZOIN 0.6CC

## (undated) DEVICE — DRSG STERISTRIPS 0.25 X 3"

## (undated) DEVICE — DRAPE INSTRUMENT POUCH 6.75" X 11"

## (undated) DEVICE — GLV 7 PROTEXIS